# Patient Record
Sex: FEMALE | Race: WHITE | Employment: FULL TIME | URBAN - METROPOLITAN AREA
[De-identification: names, ages, dates, MRNs, and addresses within clinical notes are randomized per-mention and may not be internally consistent; named-entity substitution may affect disease eponyms.]

---

## 2020-09-27 ENCOUNTER — HOSPITAL ENCOUNTER (EMERGENCY)
Age: 40
Discharge: HOME | End: 2020-09-27
Attending: EMERGENCY MEDICINE
Payer: COMMERCIAL

## 2020-09-27 VITALS
WEIGHT: 185.19 LBS | BODY MASS INDEX: 29.07 KG/M2 | TEMPERATURE: 98.2 F | HEART RATE: 93 BPM | OXYGEN SATURATION: 98 % | HEIGHT: 67 IN | RESPIRATION RATE: 16 BRPM | DIASTOLIC BLOOD PRESSURE: 93 MMHG | SYSTOLIC BLOOD PRESSURE: 144 MMHG

## 2020-09-27 DIAGNOSIS — S61.218A LACERATION OF INDEX FINGER WITHOUT FOREIGN BODY WITHOUT DAMAGE TO NAIL, UNSPECIFIED LATERALITY, INITIAL ENCOUNTER: Primary | ICD-10-CM

## 2020-09-27 PROCEDURE — 74011250636 HC RX REV CODE- 250/636: Performed by: EMERGENCY MEDICINE

## 2020-09-27 PROCEDURE — 77030002916 HC SUT ETHLN J&J -A

## 2020-09-27 PROCEDURE — 99282 EMERGENCY DEPT VISIT SF MDM: CPT

## 2020-09-27 PROCEDURE — 75810000293 HC SIMP/SUPERF WND  RPR

## 2020-09-27 PROCEDURE — 74011000250 HC RX REV CODE- 250: Performed by: EMERGENCY MEDICINE

## 2020-09-27 PROCEDURE — 90715 TDAP VACCINE 7 YRS/> IM: CPT | Performed by: EMERGENCY MEDICINE

## 2020-09-27 PROCEDURE — 90471 IMMUNIZATION ADMIN: CPT

## 2020-09-27 RX ORDER — LIDOCAINE HYDROCHLORIDE 10 MG/ML
10 INJECTION, SOLUTION EPIDURAL; INFILTRATION; INTRACAUDAL; PERINEURAL ONCE
Status: COMPLETED | OUTPATIENT
Start: 2020-09-27 | End: 2020-09-27

## 2020-09-27 RX ADMIN — TETANUS TOXOID, REDUCED DIPHTHERIA TOXOID AND ACELLULAR PERTUSSIS VACCINE, ADSORBED 0.5 ML: 5; 2.5; 8; 8; 2.5 SUSPENSION INTRAMUSCULAR at 12:16

## 2020-09-27 RX ADMIN — LIDOCAINE HYDROCHLORIDE 10 ML: 10 INJECTION, SOLUTION EPIDURAL; INFILTRATION; INTRACAUDAL; PERINEURAL at 12:04

## 2020-09-27 NOTE — DISCHARGE INSTRUCTIONS
Patient Education        Cuts on the Hand Closed With Stitches: Care Instructions  Your Care Instructions     A cut on your hand can be on your fingers, your thumb, or the front or back of your hand. Sometimes a cut can injure the tendons, blood vessels, or nerves of your hand. The doctor used stitches to close the cut. Using stitches also helps the cut heal and reduces scarring. The doctor may have given you a splint to help prevent you from moving your hand, fingers, or thumb. If the cut went deep and through the skin, the doctor put in two layers of stitches. The deeper layer brings the deep part of the cut together. These stitches will dissolve and don't need to be removed. The stitches in the upper layer are the ones you see on the cut. You will probably have a bandage. You will need to have the stitches removed, usually in 7 to 14 days. The doctor may suggest that you see a hand specialist if the cut is very deep or if you have trouble moving your fingers or have less feeling in your hand. The doctor has checked you carefully, but problems can develop later. If you notice any problems or new symptoms, get medical treatment right away. Follow-up care is a key part of your treatment and safety. Be sure to make and go to all appointments, and call your doctor if you are having problems. It's also a good idea to know your test results and keep a list of the medicines you take. How can you care for yourself at home? · Keep the cut dry for the first 24 to 48 hours. After this, you can shower if your doctor okays it. Pat the cut dry. · Don't soak the cut, such as in a bathtub. Your doctor will tell you when it's safe to get the cut wet. · If your doctor told you how to care for your cut, follow your doctor's instructions. If you did not get instructions, follow this general advice:  ? After the first 24 to 48 hours, wash around the cut with clean water 2 times a day.  Don't use hydrogen peroxide or alcohol, which can slow healing. ? You may cover the cut with a thin layer of petroleum jelly, such as Vaseline, and a nonstick bandage. ? Apply more petroleum jelly and replace the bandage as needed. · Prop up the sore hand on a pillow anytime you sit or lie down during the next 3 days. Try to keep it above the level of your heart. This will help reduce swelling. · Avoid any activity that could cause your cut to reopen. · Do not remove the stitches on your own. Your doctor will tell you when to come back to have the stitches removed. · Be safe with medicines. Take pain medicines exactly as directed. ? If the doctor gave you a prescription medicine for pain, take it as prescribed. ? If you are not taking a prescription pain medicine, ask your doctor if you can take an over-the-counter medicine. When should you call for help? Call your doctor now or seek immediate medical care if:    · You have new pain, or your pain gets worse.     · The skin near the cut is cold or pale or changes color.     · You have tingling, weakness, or numbness near the cut.     · The cut starts to bleed, and blood soaks through the bandage. Oozing small amounts of blood is normal.     · You have trouble moving the area of the hand near the cut.     · You have symptoms of infection, such as:  ? Increased pain, swelling, warmth, or redness around the cut.  ? Red streaks leading from the cut.  ? Pus draining from the cut.  ? A fever. Watch closely for changes in your health, and be sure to contact your doctor if:    · You do not get better as expected. Where can you learn more? Go to http://michael-brenda.info/  Enter T250 in the search box to learn more about \"Cuts on the Hand Closed With Stitches: Care Instructions. \"  Current as of: June 26, 2019               Content Version: 12.6  © 4098-0132 [employer].    Care instructions adapted under license by [employer] (which disclaims liability or warranty for this information). If you have questions about a medical condition or this instruction, always ask your healthcare professional. [place] [address] any warranty or liability for your use of this information.

## 2020-09-27 NOTE — ED TRIAGE NOTES
Pt presents to ED with c/o laceration to volar surface of left 2nd finger. Pt cut finger on pastry knife about 45 minutes PTA. Bleeding controlled. Dr Michelle Andersen in to examine pt.

## 2020-09-27 NOTE — ED PROVIDER NOTES
HPI the patient has a 2 cm laceration to the left index finger, palmar surface, just distal to the DIP joint. Distal capillary refill and sensation is intact. Range of motion of the DIP joint is normal.    Past Medical History:   Diagnosis Date    Family history of skin cancer     Sunburn, blistering        Past Surgical History:   Procedure Laterality Date    HX  SECTION  2008    HX CYST REMOVAL  2002    cyst removed on wrist    HX WISDOM TEETH EXTRACTION           Family History:   Problem Relation Age of Onset    Hypertension Father     High Cholesterol Father        Social History     Socioeconomic History    Marital status:      Spouse name: Not on file    Number of children: Not on file    Years of education: Not on file    Highest education level: Not on file   Occupational History    Not on file   Social Needs    Financial resource strain: Not on file    Food insecurity     Worry: Not on file     Inability: Not on file    Transportation needs     Medical: Not on file     Non-medical: Not on file   Tobacco Use    Smoking status: Former Smoker    Smokeless tobacco: Never Used   Substance and Sexual Activity    Alcohol use:  Yes     Alcohol/week: 3.3 standard drinks     Types: 4 Glasses of wine per week    Drug use: Not on file    Sexual activity: Not on file   Lifestyle    Physical activity     Days per week: Not on file     Minutes per session: Not on file    Stress: Not on file   Relationships    Social connections     Talks on phone: Not on file     Gets together: Not on file     Attends Worship service: Not on file     Active member of club or organization: Not on file     Attends meetings of clubs or organizations: Not on file     Relationship status: Not on file    Intimate partner violence     Fear of current or ex partner: Not on file     Emotionally abused: Not on file     Physically abused: Not on file     Forced sexual activity: Not on file   Other Topics Concern    Not on file   Social History Narrative    Not on file         ALLERGIES: Patient has no known allergies. Review of Systems   Skin: Positive for wound. There were no vitals filed for this visit. Physical Exam  Constitutional:       General: She is not in acute distress. Appearance: She is well-developed. HENT:      Head: Normocephalic. Neck:      Musculoskeletal: Normal range of motion. Cardiovascular:      Rate and Rhythm: Normal rate. Pulmonary:      Effort: Pulmonary effort is normal.   Musculoskeletal: Normal range of motion. Skin:     General: Skin is warm and dry. Capillary Refill: Capillary refill takes less than 2 seconds. Comments: See HPI   Neurological:      Mental Status: She is alert. Psychiatric:         Behavior: Behavior normal.          [facility]       Wound Repair    Date/Time: 9/27/2020 11:56 AM  Performed by: attendingPreparation: skin prepped with Betadine  Location details: left index finger  Wound length:2.5 cm or less  Anesthesia: digital block    Anesthesia:  Local Anesthetic: lidocaine 1% without epinephrine  Foreign bodies: no foreign bodies  Skin closure: 5-0 nylon  Technique: simple  Patient tolerance: Patient tolerated the procedure well with no immediate complications  My total time at bedside, performing this procedure was 16-30 minutes.

## 2023-04-22 ENCOUNTER — APPOINTMENT (OUTPATIENT)
Dept: CT IMAGING | Age: 43
End: 2023-04-22
Attending: EMERGENCY MEDICINE
Payer: COMMERCIAL

## 2023-04-22 ENCOUNTER — HOSPITAL ENCOUNTER (OUTPATIENT)
Age: 43
Setting detail: OBSERVATION
Discharge: HOME | End: 2023-04-25
Attending: EMERGENCY MEDICINE | Admitting: INTERNAL MEDICINE
Payer: COMMERCIAL

## 2023-04-22 DIAGNOSIS — K80.50 CHOLEDOCHOLITHIASIS: ICD-10-CM

## 2023-04-22 DIAGNOSIS — K80.50 BILIARY COLIC: Primary | ICD-10-CM

## 2023-04-22 LAB
ALBUMIN SERPL-MCNC: 3.8 G/DL (ref 3.5–5)
ALBUMIN/GLOB SERPL: 1.4 (ref 1.1–2.2)
ALP SERPL-CCNC: 286 U/L (ref 45–117)
ALT SERPL-CCNC: 733 U/L (ref 12–78)
ANION GAP SERPL CALC-SCNC: 13 MMOL/L (ref 5–15)
APPEARANCE UR: CLEAR
AST SERPL-CCNC: 224 U/L (ref 15–37)
BACTERIA URNS QL MICRO: ABNORMAL /HPF
BASOPHILS # BLD: 0.1 K/UL (ref 0–0.1)
BASOPHILS NFR BLD: 1 % (ref 0–1)
BILIRUB SERPL-MCNC: 3 MG/DL (ref 0.2–1)
BILIRUB UR QL CFM: POSITIVE
BUN SERPL-MCNC: 9 MG/DL (ref 6–20)
BUN/CREAT SERPL: 10 (ref 12–20)
CALCIUM SERPL-MCNC: 8.9 MG/DL (ref 8.5–10.1)
CHLORIDE SERPL-SCNC: 102 MMOL/L (ref 97–108)
CO2 SERPL-SCNC: 26 MMOL/L (ref 21–32)
COLOR UR: ABNORMAL
CREAT SERPL-MCNC: 0.88 MG/DL (ref 0.55–1.02)
DIFFERENTIAL METHOD BLD: ABNORMAL
EOSINOPHIL # BLD: 0.1 K/UL (ref 0–0.4)
EOSINOPHIL NFR BLD: 1 % (ref 0–7)
EPITH CASTS URNS QL MICRO: ABNORMAL /LPF
ERYTHROCYTE [DISTWIDTH] IN BLOOD BY AUTOMATED COUNT: 13.6 % (ref 11.5–14.5)
GLOBULIN SER CALC-MCNC: 2.8 G/DL (ref 2–4)
GLUCOSE SERPL-MCNC: 104 MG/DL (ref 65–100)
GLUCOSE UR STRIP.AUTO-MCNC: NEGATIVE MG/DL
HCG UR QL: NEGATIVE
HCT VFR BLD AUTO: 42.6 % (ref 35–47)
HGB BLD-MCNC: 13.9 G/DL (ref 11.5–16)
HGB UR QL STRIP: NEGATIVE
IMM GRANULOCYTES # BLD AUTO: 0.1 K/UL (ref 0–0.04)
IMM GRANULOCYTES NFR BLD AUTO: 1 % (ref 0–0.5)
KETONES UR QL STRIP.AUTO: ABNORMAL MG/DL
LEUKOCYTE ESTERASE UR QL STRIP.AUTO: NEGATIVE
LIPASE SERPL-CCNC: 156 U/L (ref 73–393)
LYMPHOCYTES # BLD: 2.9 K/UL (ref 0.8–3.5)
LYMPHOCYTES NFR BLD: 40 % (ref 12–49)
MCH RBC QN AUTO: 30 PG (ref 26–34)
MCHC RBC AUTO-ENTMCNC: 32.6 G/DL (ref 30–36.5)
MCV RBC AUTO: 91.8 FL (ref 80–99)
MONOCYTES # BLD: 0.5 K/UL (ref 0–1)
MONOCYTES NFR BLD: 7 % (ref 5–13)
NEUTS SEG # BLD: 3.8 K/UL (ref 1.8–8)
NEUTS SEG NFR BLD: 51 % (ref 32–75)
NITRITE UR QL STRIP.AUTO: NEGATIVE
NRBC # BLD: 0 K/UL (ref 0–0.01)
NRBC BLD-RTO: 0 PER 100 WBC
PH UR STRIP: 7 (ref 5–8)
PLATELET # BLD AUTO: 284 K/UL (ref 150–400)
PMV BLD AUTO: 9.5 FL (ref 8.9–12.9)
POTASSIUM SERPL-SCNC: 4.2 MMOL/L (ref 3.5–5.1)
PROT SERPL-MCNC: 6.6 G/DL (ref 6.4–8.2)
PROT UR STRIP-MCNC: NEGATIVE MG/DL
RBC # BLD AUTO: 4.64 M/UL (ref 3.8–5.2)
RBC #/AREA URNS HPF: ABNORMAL /HPF (ref 0–5)
SODIUM SERPL-SCNC: 141 MMOL/L (ref 136–145)
SP GR UR REFRACTOMETRY: 1.01 (ref 1–1.03)
TROPONIN I SERPL HS-MCNC: 5 NG/L (ref 0–51)
UR CULT HOLD, URHOLD: NORMAL
UROBILINOGEN UR QL STRIP.AUTO: 1 EU/DL (ref 0.2–1)
WBC # BLD AUTO: 7.4 K/UL (ref 3.6–11)
WBC URNS QL MICRO: ABNORMAL /HPF (ref 0–4)

## 2023-04-22 PROCEDURE — 74011250636 HC RX REV CODE- 250/636: Performed by: INTERNAL MEDICINE

## 2023-04-22 PROCEDURE — 74011000250 HC RX REV CODE- 250: Performed by: EMERGENCY MEDICINE

## 2023-04-22 PROCEDURE — 93005 ELECTROCARDIOGRAM TRACING: CPT

## 2023-04-22 PROCEDURE — 36415 COLL VENOUS BLD VENIPUNCTURE: CPT

## 2023-04-22 PROCEDURE — 96374 THER/PROPH/DIAG INJ IV PUSH: CPT

## 2023-04-22 PROCEDURE — 80053 COMPREHEN METABOLIC PANEL: CPT

## 2023-04-22 PROCEDURE — 85025 COMPLETE CBC W/AUTO DIFF WBC: CPT

## 2023-04-22 PROCEDURE — 74177 CT ABD & PELVIS W/CONTRAST: CPT

## 2023-04-22 PROCEDURE — 74011000636 HC RX REV CODE- 636: Performed by: EMERGENCY MEDICINE

## 2023-04-22 PROCEDURE — G0378 HOSPITAL OBSERVATION PER HR: HCPCS

## 2023-04-22 PROCEDURE — 84484 ASSAY OF TROPONIN QUANT: CPT

## 2023-04-22 PROCEDURE — 83690 ASSAY OF LIPASE: CPT

## 2023-04-22 PROCEDURE — 81001 URINALYSIS AUTO W/SCOPE: CPT

## 2023-04-22 PROCEDURE — 81025 URINE PREGNANCY TEST: CPT

## 2023-04-22 PROCEDURE — C9113 INJ PANTOPRAZOLE SODIUM, VIA: HCPCS | Performed by: EMERGENCY MEDICINE

## 2023-04-22 PROCEDURE — 99285 EMERGENCY DEPT VISIT HI MDM: CPT

## 2023-04-22 PROCEDURE — 74011250636 HC RX REV CODE- 250/636: Performed by: EMERGENCY MEDICINE

## 2023-04-22 PROCEDURE — 96375 TX/PRO/DX INJ NEW DRUG ADDON: CPT

## 2023-04-22 RX ORDER — POLYETHYLENE GLYCOL 3350 17 G/17G
17 POWDER, FOR SOLUTION ORAL DAILY PRN
Status: DISCONTINUED | OUTPATIENT
Start: 2023-04-22 | End: 2023-04-25 | Stop reason: HOSPADM

## 2023-04-22 RX ORDER — ACETAMINOPHEN 325 MG/1
650 TABLET ORAL
Status: DISCONTINUED | OUTPATIENT
Start: 2023-04-22 | End: 2023-04-22

## 2023-04-22 RX ORDER — MORPHINE SULFATE 2 MG/ML
2 INJECTION, SOLUTION INTRAMUSCULAR; INTRAVENOUS
Status: COMPLETED | OUTPATIENT
Start: 2023-04-22 | End: 2023-04-22

## 2023-04-22 RX ORDER — ENOXAPARIN SODIUM 100 MG/ML
40 INJECTION SUBCUTANEOUS DAILY
Status: DISCONTINUED | OUTPATIENT
Start: 2023-04-23 | End: 2023-04-23

## 2023-04-22 RX ORDER — SODIUM CHLORIDE 0.9 % (FLUSH) 0.9 %
5-40 SYRINGE (ML) INJECTION AS NEEDED
Status: DISCONTINUED | OUTPATIENT
Start: 2023-04-22 | End: 2023-04-25 | Stop reason: HOSPADM

## 2023-04-22 RX ORDER — ACETAMINOPHEN 650 MG/1
650 SUPPOSITORY RECTAL
Status: DISCONTINUED | OUTPATIENT
Start: 2023-04-22 | End: 2023-04-22

## 2023-04-22 RX ORDER — SODIUM CHLORIDE 0.9 % (FLUSH) 0.9 %
5-40 SYRINGE (ML) INJECTION EVERY 8 HOURS
Status: DISCONTINUED | OUTPATIENT
Start: 2023-04-22 | End: 2023-04-25 | Stop reason: HOSPADM

## 2023-04-22 RX ORDER — SODIUM CHLORIDE, SODIUM LACTATE, POTASSIUM CHLORIDE, CALCIUM CHLORIDE 600; 310; 30; 20 MG/100ML; MG/100ML; MG/100ML; MG/100ML
125 INJECTION, SOLUTION INTRAVENOUS CONTINUOUS
Status: DISCONTINUED | OUTPATIENT
Start: 2023-04-22 | End: 2023-04-25 | Stop reason: HOSPADM

## 2023-04-22 RX ORDER — HYDROMORPHONE HYDROCHLORIDE 1 MG/ML
0.5 INJECTION, SOLUTION INTRAMUSCULAR; INTRAVENOUS; SUBCUTANEOUS
Status: DISCONTINUED | OUTPATIENT
Start: 2023-04-22 | End: 2023-04-25 | Stop reason: HOSPADM

## 2023-04-22 RX ORDER — ONDANSETRON 2 MG/ML
4 INJECTION INTRAMUSCULAR; INTRAVENOUS
Status: COMPLETED | OUTPATIENT
Start: 2023-04-22 | End: 2023-04-22

## 2023-04-22 RX ORDER — ONDANSETRON 4 MG/1
4 TABLET, ORALLY DISINTEGRATING ORAL
Status: DISCONTINUED | OUTPATIENT
Start: 2023-04-22 | End: 2023-04-25 | Stop reason: HOSPADM

## 2023-04-22 RX ORDER — ONDANSETRON 2 MG/ML
4 INJECTION INTRAMUSCULAR; INTRAVENOUS
Status: DISCONTINUED | OUTPATIENT
Start: 2023-04-22 | End: 2023-04-25 | Stop reason: HOSPADM

## 2023-04-22 RX ADMIN — MORPHINE SULFATE 2 MG: 2 INJECTION, SOLUTION INTRAMUSCULAR; INTRAVENOUS at 23:27

## 2023-04-22 RX ADMIN — ONDANSETRON 4 MG: 2 INJECTION INTRAMUSCULAR; INTRAVENOUS at 21:28

## 2023-04-22 RX ADMIN — SODIUM CHLORIDE 80 MG: 9 INJECTION, SOLUTION INTRAMUSCULAR; INTRAVENOUS; SUBCUTANEOUS at 21:28

## 2023-04-22 RX ADMIN — IOPAMIDOL 100 ML: 755 INJECTION, SOLUTION INTRAVENOUS at 21:58

## 2023-04-22 RX ADMIN — SODIUM CHLORIDE, POTASSIUM CHLORIDE, SODIUM LACTATE AND CALCIUM CHLORIDE 125 ML/HR: 600; 310; 30; 20 INJECTION, SOLUTION INTRAVENOUS at 23:27

## 2023-04-23 PROBLEM — R79.89 ELEVATED LFTS: Status: ACTIVE | Noted: 2023-04-23

## 2023-04-23 PROBLEM — I10 HTN (HYPERTENSION), BENIGN: Status: ACTIVE | Noted: 2023-04-23

## 2023-04-23 PROBLEM — K80.71 CALCULUS OF GALLBLADDER AND BILE DUCT WITH OBSTRUCTION: Status: ACTIVE | Noted: 2023-04-23

## 2023-04-23 LAB
ALBUMIN SERPL-MCNC: 3.4 G/DL (ref 3.5–5)
ALBUMIN/GLOB SERPL: 1.1 (ref 1.1–2.2)
ALP SERPL-CCNC: 292 U/L (ref 45–117)
ALT SERPL-CCNC: 662 U/L (ref 12–78)
ANION GAP SERPL CALC-SCNC: 3 MMOL/L (ref 5–15)
AST SERPL-CCNC: 225 U/L (ref 15–37)
ATRIAL RATE: 68 BPM
BASOPHILS # BLD: 0.1 K/UL (ref 0–0.1)
BASOPHILS NFR BLD: 1 % (ref 0–1)
BILIRUB SERPL-MCNC: 3.1 MG/DL (ref 0.2–1)
BUN SERPL-MCNC: 6 MG/DL (ref 6–20)
BUN/CREAT SERPL: 8 (ref 12–20)
CALCIUM SERPL-MCNC: 8.5 MG/DL (ref 8.5–10.1)
CALCULATED P AXIS, ECG09: 34 DEGREES
CALCULATED R AXIS, ECG10: 7 DEGREES
CALCULATED T AXIS, ECG11: 28 DEGREES
CHLORIDE SERPL-SCNC: 110 MMOL/L (ref 97–108)
CO2 SERPL-SCNC: 25 MMOL/L (ref 21–32)
CREAT SERPL-MCNC: 0.77 MG/DL (ref 0.55–1.02)
DIAGNOSIS, 93000: NORMAL
DIFFERENTIAL METHOD BLD: NORMAL
EOSINOPHIL # BLD: 0.1 K/UL (ref 0–0.4)
EOSINOPHIL NFR BLD: 1 % (ref 0–7)
ERYTHROCYTE [DISTWIDTH] IN BLOOD BY AUTOMATED COUNT: 13.7 % (ref 11.5–14.5)
GLOBULIN SER CALC-MCNC: 3 G/DL (ref 2–4)
GLUCOSE SERPL-MCNC: 109 MG/DL (ref 65–100)
HCT VFR BLD AUTO: 41.7 % (ref 35–47)
HGB BLD-MCNC: 13.8 G/DL (ref 11.5–16)
IMM GRANULOCYTES # BLD AUTO: 0 K/UL (ref 0–0.04)
IMM GRANULOCYTES NFR BLD AUTO: 0 % (ref 0–0.5)
LYMPHOCYTES # BLD: 2.3 K/UL (ref 0.8–3.5)
LYMPHOCYTES NFR BLD: 31 % (ref 12–49)
MCH RBC QN AUTO: 29.9 PG (ref 26–34)
MCHC RBC AUTO-ENTMCNC: 33.1 G/DL (ref 30–36.5)
MCV RBC AUTO: 90.5 FL (ref 80–99)
MONOCYTES # BLD: 0.5 K/UL (ref 0–1)
MONOCYTES NFR BLD: 7 % (ref 5–13)
NEUTS SEG # BLD: 4.4 K/UL (ref 1.8–8)
NEUTS SEG NFR BLD: 60 % (ref 32–75)
NRBC # BLD: 0 K/UL (ref 0–0.01)
NRBC BLD-RTO: 0 PER 100 WBC
P-R INTERVAL, ECG05: 136 MS
PLATELET # BLD AUTO: 287 K/UL (ref 150–400)
PMV BLD AUTO: 9.7 FL (ref 8.9–12.9)
POTASSIUM SERPL-SCNC: 4 MMOL/L (ref 3.5–5.1)
PROT SERPL-MCNC: 6.4 G/DL (ref 6.4–8.2)
Q-T INTERVAL, ECG07: 392 MS
QRS DURATION, ECG06: 84 MS
QTC CALCULATION (BEZET), ECG08: 416 MS
RBC # BLD AUTO: 4.61 M/UL (ref 3.8–5.2)
SODIUM SERPL-SCNC: 138 MMOL/L (ref 136–145)
VENTRICULAR RATE, ECG03: 68 BPM
WBC # BLD AUTO: 7.4 K/UL (ref 3.6–11)

## 2023-04-23 PROCEDURE — G0378 HOSPITAL OBSERVATION PER HR: HCPCS

## 2023-04-23 PROCEDURE — 80053 COMPREHEN METABOLIC PANEL: CPT

## 2023-04-23 PROCEDURE — 74011000250 HC RX REV CODE- 250: Performed by: INTERNAL MEDICINE

## 2023-04-23 PROCEDURE — 85025 COMPLETE CBC W/AUTO DIFF WBC: CPT

## 2023-04-23 PROCEDURE — 96375 TX/PRO/DX INJ NEW DRUG ADDON: CPT

## 2023-04-23 PROCEDURE — 36415 COLL VENOUS BLD VENIPUNCTURE: CPT

## 2023-04-23 PROCEDURE — 74011250636 HC RX REV CODE- 250/636: Performed by: INTERNAL MEDICINE

## 2023-04-23 PROCEDURE — 96376 TX/PRO/DX INJ SAME DRUG ADON: CPT

## 2023-04-23 PROCEDURE — 87086 URINE CULTURE/COLONY COUNT: CPT

## 2023-04-23 RX ADMIN — Medication 10 ML: at 13:37

## 2023-04-23 RX ADMIN — HYDROMORPHONE HYDROCHLORIDE 0.5 MG: 1 INJECTION, SOLUTION INTRAMUSCULAR; INTRAVENOUS; SUBCUTANEOUS at 21:24

## 2023-04-23 RX ADMIN — HYDROMORPHONE HYDROCHLORIDE 0.5 MG: 1 INJECTION, SOLUTION INTRAMUSCULAR; INTRAVENOUS; SUBCUTANEOUS at 18:20

## 2023-04-23 RX ADMIN — Medication 10 ML: at 01:30

## 2023-04-23 RX ADMIN — ONDANSETRON 4 MG: 2 INJECTION INTRAMUSCULAR; INTRAVENOUS at 10:19

## 2023-04-23 RX ADMIN — Medication 10 ML: at 20:42

## 2023-04-23 RX ADMIN — SODIUM CHLORIDE, POTASSIUM CHLORIDE, SODIUM LACTATE AND CALCIUM CHLORIDE 125 ML/HR: 600; 310; 30; 20 INJECTION, SOLUTION INTRAVENOUS at 20:42

## 2023-04-23 RX ADMIN — Medication 10 ML: at 06:00

## 2023-04-23 RX ADMIN — SODIUM CHLORIDE, POTASSIUM CHLORIDE, SODIUM LACTATE AND CALCIUM CHLORIDE 125 ML/HR: 600; 310; 30; 20 INJECTION, SOLUTION INTRAVENOUS at 09:42

## 2023-04-24 ENCOUNTER — APPOINTMENT (OUTPATIENT)
Dept: GENERAL RADIOLOGY | Age: 43
End: 2023-04-24
Attending: INTERNAL MEDICINE
Payer: COMMERCIAL

## 2023-04-24 ENCOUNTER — ANESTHESIA EVENT (OUTPATIENT)
Dept: SURGERY | Age: 43
End: 2023-04-24
Payer: COMMERCIAL

## 2023-04-24 ENCOUNTER — ANESTHESIA (OUTPATIENT)
Dept: SURGERY | Age: 43
End: 2023-04-24
Payer: COMMERCIAL

## 2023-04-24 LAB
ALBUMIN SERPL-MCNC: 3.4 G/DL (ref 3.5–5)
ALBUMIN/GLOB SERPL: 1.1 (ref 1.1–2.2)
ALP SERPL-CCNC: 323 U/L (ref 45–117)
ALT SERPL-CCNC: 551 U/L (ref 12–78)
ANION GAP SERPL CALC-SCNC: 2 MMOL/L (ref 5–15)
AST SERPL-CCNC: 136 U/L (ref 15–37)
BACTERIA SPEC CULT: NORMAL
BASOPHILS # BLD: 0.1 K/UL (ref 0–0.1)
BASOPHILS NFR BLD: 1 % (ref 0–1)
BILIRUB SERPL-MCNC: 4.8 MG/DL (ref 0.2–1)
BUN SERPL-MCNC: 7 MG/DL (ref 6–20)
BUN/CREAT SERPL: 9 (ref 12–20)
CALCIUM SERPL-MCNC: 8.6 MG/DL (ref 8.5–10.1)
CHLORIDE SERPL-SCNC: 108 MMOL/L (ref 97–108)
CO2 SERPL-SCNC: 27 MMOL/L (ref 21–32)
CREAT SERPL-MCNC: 0.81 MG/DL (ref 0.55–1.02)
DIFFERENTIAL METHOD BLD: ABNORMAL
EOSINOPHIL # BLD: 0.1 K/UL (ref 0–0.4)
EOSINOPHIL NFR BLD: 1 % (ref 0–7)
ERYTHROCYTE [DISTWIDTH] IN BLOOD BY AUTOMATED COUNT: 14.1 % (ref 11.5–14.5)
GLOBULIN SER CALC-MCNC: 3 G/DL (ref 2–4)
GLUCOSE SERPL-MCNC: 125 MG/DL (ref 65–100)
HCT VFR BLD AUTO: 42.3 % (ref 35–47)
HGB BLD-MCNC: 14.3 G/DL (ref 11.5–16)
IMM GRANULOCYTES # BLD AUTO: 0 K/UL (ref 0–0.04)
IMM GRANULOCYTES NFR BLD AUTO: 1 % (ref 0–0.5)
LYMPHOCYTES # BLD: 2.1 K/UL (ref 0.8–3.5)
LYMPHOCYTES NFR BLD: 24 % (ref 12–49)
MCH RBC QN AUTO: 30.8 PG (ref 26–34)
MCHC RBC AUTO-ENTMCNC: 33.8 G/DL (ref 30–36.5)
MCV RBC AUTO: 91 FL (ref 80–99)
MONOCYTES # BLD: 0.6 K/UL (ref 0–1)
MONOCYTES NFR BLD: 7 % (ref 5–13)
NEUTS SEG # BLD: 5.8 K/UL (ref 1.8–8)
NEUTS SEG NFR BLD: 66 % (ref 32–75)
NRBC # BLD: 0 K/UL (ref 0–0.01)
NRBC BLD-RTO: 0 PER 100 WBC
PLATELET # BLD AUTO: 295 K/UL (ref 150–400)
PMV BLD AUTO: 9.6 FL (ref 8.9–12.9)
POTASSIUM SERPL-SCNC: 4.2 MMOL/L (ref 3.5–5.1)
PROT SERPL-MCNC: 6.4 G/DL (ref 6.4–8.2)
RBC # BLD AUTO: 4.65 M/UL (ref 3.8–5.2)
SERVICE CMNT-IMP: NORMAL
SODIUM SERPL-SCNC: 137 MMOL/L (ref 136–145)
WBC # BLD AUTO: 8.6 K/UL (ref 3.6–11)

## 2023-04-24 PROCEDURE — 77030034668 HC DEV CLOS PUNC DISP ANCH -B: Performed by: SURGERY

## 2023-04-24 PROCEDURE — 77030028402 HC SYS LAPSC TISS RETRV AMR -B: Performed by: SURGERY

## 2023-04-24 PROCEDURE — 80053 COMPREHEN METABOLIC PANEL: CPT

## 2023-04-24 PROCEDURE — 77030012770 HC TRCR OPT FX AMR -B: Performed by: SURGERY

## 2023-04-24 PROCEDURE — 76210000016 HC OR PH I REC 1 TO 1.5 HR: Performed by: SURGERY

## 2023-04-24 PROCEDURE — C1726 CATH, BAL DIL, NON-VASCULAR: HCPCS | Performed by: SURGERY

## 2023-04-24 PROCEDURE — 74011250637 HC RX REV CODE- 250/637: Performed by: INTERNAL MEDICINE

## 2023-04-24 PROCEDURE — 74011250636 HC RX REV CODE- 250/636: Performed by: NURSE ANESTHETIST, CERTIFIED REGISTERED

## 2023-04-24 PROCEDURE — G0378 HOSPITAL OBSERVATION PER HR: HCPCS

## 2023-04-24 PROCEDURE — 77030031656 HC FCPS ENDO GRSP DISP BSC -B: Performed by: SURGERY

## 2023-04-24 PROCEDURE — 74011000250 HC RX REV CODE- 250: Performed by: INTERNAL MEDICINE

## 2023-04-24 PROCEDURE — 76010000877 HC OR TIME 2.5 TO 3HR INTENSV - TIER 2: Performed by: SURGERY

## 2023-04-24 PROCEDURE — 77030010104 HC SEAL PRT ENDOSC BYRN -B: Performed by: SURGERY

## 2023-04-24 PROCEDURE — 74300 X-RAY BILE DUCTS/PANCREAS: CPT

## 2023-04-24 PROCEDURE — 88304 TISSUE EXAM BY PATHOLOGIST: CPT

## 2023-04-24 PROCEDURE — 77030040922 HC BLNKT HYPOTHRM STRY -A

## 2023-04-24 PROCEDURE — 77030040361 HC SLV COMPR DVT MDII -B

## 2023-04-24 PROCEDURE — 77030035277 HC OBTRTR BLDELSS DISP INTU -B: Performed by: SURGERY

## 2023-04-24 PROCEDURE — 77030011761 HC SPHNTOM BILI BSC -C: Performed by: SURGERY

## 2023-04-24 PROCEDURE — 77030038093 HC CATH CHOLGM OP PMI PRGV-C: Performed by: SURGERY

## 2023-04-24 PROCEDURE — 2709999900 HC NON-CHARGEABLE SUPPLY: Performed by: SURGERY

## 2023-04-24 PROCEDURE — 76060000036 HC ANESTHESIA 2.5 TO 3 HR: Performed by: SURGERY

## 2023-04-24 PROCEDURE — 77030040926 HC LAP BG TISS RETVR CNMD -B: Performed by: SURGERY

## 2023-04-24 PROCEDURE — 74011000254 HC RX REV CODE- 254: Performed by: SURGERY

## 2023-04-24 PROCEDURE — 74011000250 HC RX REV CODE- 250: Performed by: NURSE ANESTHETIST, CERTIFIED REGISTERED

## 2023-04-24 PROCEDURE — 74011000636 HC RX REV CODE- 636: Performed by: SURGERY

## 2023-04-24 PROCEDURE — 74011250636 HC RX REV CODE- 250/636: Performed by: SURGERY

## 2023-04-24 PROCEDURE — 77030010603 HC BLN DEV INFL BSC -B: Performed by: SURGERY

## 2023-04-24 PROCEDURE — 36415 COLL VENOUS BLD VENIPUNCTURE: CPT

## 2023-04-24 PROCEDURE — 77030020703 HC SEAL CANN DISP INTU -B: Performed by: SURGERY

## 2023-04-24 PROCEDURE — 77030041276 HC SPHNTOM BILI BSC -F: Performed by: SURGERY

## 2023-04-24 PROCEDURE — 74011250636 HC RX REV CODE- 250/636: Performed by: NURSE PRACTITIONER

## 2023-04-24 PROCEDURE — 77030021593 HC FCPS BIOP ENDOSC BSC -A: Performed by: SURGERY

## 2023-04-24 PROCEDURE — 77030020747 HC TU INSUF ENDOSC TELE -A: Performed by: SURGERY

## 2023-04-24 PROCEDURE — 74011000250 HC RX REV CODE- 250: Performed by: SURGERY

## 2023-04-24 PROCEDURE — 85025 COMPLETE CBC W/AUTO DIFF WBC: CPT

## 2023-04-24 PROCEDURE — 77030031139 HC SUT VCRL2 J&J -A: Performed by: SURGERY

## 2023-04-24 PROCEDURE — 74011250636 HC RX REV CODE- 250/636: Performed by: ANESTHESIOLOGY

## 2023-04-24 PROCEDURE — C1769 GUIDE WIRE: HCPCS | Performed by: SURGERY

## 2023-04-24 PROCEDURE — 77030035029 HC NDL INSUF VERES DISP COVD -B: Performed by: SURGERY

## 2023-04-24 PROCEDURE — 77030007288 HC DEV LOK BILI BSC -A: Performed by: SURGERY

## 2023-04-24 PROCEDURE — 96375 TX/PRO/DX INJ NEW DRUG ADDON: CPT

## 2023-04-24 PROCEDURE — 77030013992 HC SNR POLYP ENDOSC BSC -B: Performed by: SURGERY

## 2023-04-24 PROCEDURE — 74011250636 HC RX REV CODE- 250/636: Performed by: INTERNAL MEDICINE

## 2023-04-24 PROCEDURE — 77030010507 HC ADH SKN DERMBND J&J -B: Performed by: SURGERY

## 2023-04-24 PROCEDURE — 77030009038 HC CATH BILI STN RTVR BSC -C: Performed by: SURGERY

## 2023-04-24 PROCEDURE — 96376 TX/PRO/DX INJ SAME DRUG ADON: CPT

## 2023-04-24 RX ORDER — DEXAMETHASONE SODIUM PHOSPHATE 4 MG/ML
INJECTION, SOLUTION INTRA-ARTICULAR; INTRALESIONAL; INTRAMUSCULAR; INTRAVENOUS; SOFT TISSUE AS NEEDED
Status: DISCONTINUED | OUTPATIENT
Start: 2023-04-24 | End: 2023-04-24 | Stop reason: HOSPADM

## 2023-04-24 RX ORDER — PROCHLORPERAZINE EDISYLATE 5 MG/ML
5 INJECTION INTRAMUSCULAR; INTRAVENOUS
Status: DISCONTINUED | OUTPATIENT
Start: 2023-04-24 | End: 2023-04-25 | Stop reason: HOSPADM

## 2023-04-24 RX ORDER — HYDROMORPHONE HYDROCHLORIDE 1 MG/ML
.25-1 INJECTION, SOLUTION INTRAMUSCULAR; INTRAVENOUS; SUBCUTANEOUS
Status: DISCONTINUED | OUTPATIENT
Start: 2023-04-24 | End: 2023-04-24 | Stop reason: HOSPADM

## 2023-04-24 RX ORDER — SODIUM CHLORIDE, SODIUM LACTATE, POTASSIUM CHLORIDE, CALCIUM CHLORIDE 600; 310; 30; 20 MG/100ML; MG/100ML; MG/100ML; MG/100ML
125 INJECTION, SOLUTION INTRAVENOUS CONTINUOUS
Status: DISCONTINUED | OUTPATIENT
Start: 2023-04-24 | End: 2023-04-24 | Stop reason: HOSPADM

## 2023-04-24 RX ORDER — ONDANSETRON 2 MG/ML
4 INJECTION INTRAMUSCULAR; INTRAVENOUS AS NEEDED
Status: DISCONTINUED | OUTPATIENT
Start: 2023-04-24 | End: 2023-04-24 | Stop reason: HOSPADM

## 2023-04-24 RX ORDER — MIDAZOLAM HYDROCHLORIDE 1 MG/ML
INJECTION, SOLUTION INTRAMUSCULAR; INTRAVENOUS AS NEEDED
Status: DISCONTINUED | OUTPATIENT
Start: 2023-04-24 | End: 2023-04-24 | Stop reason: HOSPADM

## 2023-04-24 RX ORDER — LIDOCAINE HYDROCHLORIDE 20 MG/ML
INJECTION, SOLUTION EPIDURAL; INFILTRATION; INTRACAUDAL; PERINEURAL AS NEEDED
Status: DISCONTINUED | OUTPATIENT
Start: 2023-04-24 | End: 2023-04-24 | Stop reason: HOSPADM

## 2023-04-24 RX ORDER — ROCURONIUM BROMIDE 10 MG/ML
INJECTION, SOLUTION INTRAVENOUS AS NEEDED
Status: DISCONTINUED | OUTPATIENT
Start: 2023-04-24 | End: 2023-04-24 | Stop reason: HOSPADM

## 2023-04-24 RX ORDER — KETOROLAC TROMETHAMINE 30 MG/ML
INJECTION, SOLUTION INTRAMUSCULAR; INTRAVENOUS AS NEEDED
Status: DISCONTINUED | OUTPATIENT
Start: 2023-04-24 | End: 2023-04-24 | Stop reason: HOSPADM

## 2023-04-24 RX ORDER — SODIUM CHLORIDE 0.9 % (FLUSH) 0.9 %
5-40 SYRINGE (ML) INJECTION AS NEEDED
Status: DISCONTINUED | OUTPATIENT
Start: 2023-04-24 | End: 2023-04-24 | Stop reason: HOSPADM

## 2023-04-24 RX ORDER — SODIUM CHLORIDE 0.9 % (FLUSH) 0.9 %
5-40 SYRINGE (ML) INJECTION EVERY 8 HOURS
Status: DISCONTINUED | OUTPATIENT
Start: 2023-04-24 | End: 2023-04-24 | Stop reason: HOSPADM

## 2023-04-24 RX ORDER — DIPHENHYDRAMINE HYDROCHLORIDE 50 MG/ML
12.5 INJECTION, SOLUTION INTRAMUSCULAR; INTRAVENOUS AS NEEDED
Status: DISCONTINUED | OUTPATIENT
Start: 2023-04-24 | End: 2023-04-24 | Stop reason: HOSPADM

## 2023-04-24 RX ORDER — FENTANYL CITRATE 50 UG/ML
INJECTION, SOLUTION INTRAMUSCULAR; INTRAVENOUS AS NEEDED
Status: DISCONTINUED | OUTPATIENT
Start: 2023-04-24 | End: 2023-04-24 | Stop reason: HOSPADM

## 2023-04-24 RX ORDER — SUCCINYLCHOLINE CHLORIDE 20 MG/ML
INJECTION INTRAMUSCULAR; INTRAVENOUS AS NEEDED
Status: DISCONTINUED | OUTPATIENT
Start: 2023-04-24 | End: 2023-04-24 | Stop reason: HOSPADM

## 2023-04-24 RX ORDER — BUPIVACAINE HYDROCHLORIDE 5 MG/ML
INJECTION, SOLUTION PERINEURAL AS NEEDED
Status: DISCONTINUED | OUTPATIENT
Start: 2023-04-24 | End: 2023-04-24 | Stop reason: HOSPADM

## 2023-04-24 RX ORDER — GLYCOPYRROLATE 0.2 MG/ML
INJECTION INTRAMUSCULAR; INTRAVENOUS AS NEEDED
Status: DISCONTINUED | OUTPATIENT
Start: 2023-04-24 | End: 2023-04-24 | Stop reason: HOSPADM

## 2023-04-24 RX ORDER — NEOSTIGMINE METHYLSULFATE 1 MG/ML
INJECTION, SOLUTION INTRAVENOUS AS NEEDED
Status: DISCONTINUED | OUTPATIENT
Start: 2023-04-24 | End: 2023-04-24 | Stop reason: HOSPADM

## 2023-04-24 RX ORDER — PROPOFOL 10 MG/ML
INJECTION, EMULSION INTRAVENOUS AS NEEDED
Status: DISCONTINUED | OUTPATIENT
Start: 2023-04-24 | End: 2023-04-24 | Stop reason: HOSPADM

## 2023-04-24 RX ORDER — FENTANYL CITRATE 50 UG/ML
25 INJECTION, SOLUTION INTRAMUSCULAR; INTRAVENOUS
Status: DISCONTINUED | OUTPATIENT
Start: 2023-04-24 | End: 2023-04-24 | Stop reason: HOSPADM

## 2023-04-24 RX ORDER — LIDOCAINE HYDROCHLORIDE 10 MG/ML
0.1 INJECTION, SOLUTION EPIDURAL; INFILTRATION; INTRACAUDAL; PERINEURAL AS NEEDED
Status: DISCONTINUED | OUTPATIENT
Start: 2023-04-24 | End: 2023-04-24 | Stop reason: HOSPADM

## 2023-04-24 RX ORDER — FAMOTIDINE 10 MG/ML
INJECTION INTRAVENOUS AS NEEDED
Status: DISCONTINUED | OUTPATIENT
Start: 2023-04-24 | End: 2023-04-24 | Stop reason: HOSPADM

## 2023-04-24 RX ORDER — ALBUTEROL SULFATE 0.83 MG/ML
2.5 SOLUTION RESPIRATORY (INHALATION) AS NEEDED
Status: DISCONTINUED | OUTPATIENT
Start: 2023-04-24 | End: 2023-04-24 | Stop reason: HOSPADM

## 2023-04-24 RX ORDER — FLUMAZENIL 0.1 MG/ML
0.2 INJECTION INTRAVENOUS
Status: DISCONTINUED | OUTPATIENT
Start: 2023-04-24 | End: 2023-04-24 | Stop reason: HOSPADM

## 2023-04-24 RX ORDER — PROPOFOL 10 MG/ML
INJECTION, EMULSION INTRAVENOUS
Status: DISCONTINUED | OUTPATIENT
Start: 2023-04-24 | End: 2023-04-24 | Stop reason: HOSPADM

## 2023-04-24 RX ORDER — ONDANSETRON 2 MG/ML
INJECTION INTRAMUSCULAR; INTRAVENOUS AS NEEDED
Status: DISCONTINUED | OUTPATIENT
Start: 2023-04-24 | End: 2023-04-24 | Stop reason: HOSPADM

## 2023-04-24 RX ORDER — NALOXONE HYDROCHLORIDE 0.4 MG/ML
0.04 INJECTION, SOLUTION INTRAMUSCULAR; INTRAVENOUS; SUBCUTANEOUS
Status: DISCONTINUED | OUTPATIENT
Start: 2023-04-24 | End: 2023-04-24 | Stop reason: HOSPADM

## 2023-04-24 RX ADMIN — GLUCAGON 0.5 MG: KIT at 16:24

## 2023-04-24 RX ADMIN — MIDAZOLAM HYDROCHLORIDE 2 MG: 1 INJECTION, SOLUTION INTRAMUSCULAR; INTRAVENOUS at 14:07

## 2023-04-24 RX ADMIN — PROCHLORPERAZINE EDISYLATE 5 MG: 5 INJECTION INTRAMUSCULAR; INTRAVENOUS at 06:01

## 2023-04-24 RX ADMIN — SUCCINYLCHOLINE CHLORIDE 140 MG: 20 INJECTION, SOLUTION INTRAMUSCULAR; INTRAVENOUS at 14:13

## 2023-04-24 RX ADMIN — FAMOTIDINE 20 MG: 10 INJECTION INTRAVENOUS at 14:20

## 2023-04-24 RX ADMIN — ONDANSETRON HYDROCHLORIDE 4 MG: 2 SOLUTION INTRAMUSCULAR; INTRAVENOUS at 15:17

## 2023-04-24 RX ADMIN — INDOCYANINE GREEN AND WATER 7.5 MG: KIT at 13:10

## 2023-04-24 RX ADMIN — KETOROLAC TROMETHAMINE 30 MG: 30 INJECTION, SOLUTION INTRAMUSCULAR; INTRAVENOUS at 16:20

## 2023-04-24 RX ADMIN — Medication 10 ML: at 06:00

## 2023-04-24 RX ADMIN — PROPOFOL 100 MCG/KG/MIN: 10 INJECTION, EMULSION INTRAVENOUS at 14:30

## 2023-04-24 RX ADMIN — FENTANYL CITRATE 50 MCG: 50 INJECTION, SOLUTION INTRAMUSCULAR; INTRAVENOUS at 14:46

## 2023-04-24 RX ADMIN — SODIUM CHLORIDE, POTASSIUM CHLORIDE, SODIUM LACTATE AND CALCIUM CHLORIDE 125 ML/HR: 600; 310; 30; 20 INJECTION, SOLUTION INTRAVENOUS at 04:56

## 2023-04-24 RX ADMIN — ROCURONIUM BROMIDE 30 MG: 10 INJECTION INTRAVENOUS at 14:34

## 2023-04-24 RX ADMIN — SODIUM CHLORIDE, POTASSIUM CHLORIDE, SODIUM LACTATE AND CALCIUM CHLORIDE 125 ML/HR: 600; 310; 30; 20 INJECTION, SOLUTION INTRAVENOUS at 19:02

## 2023-04-24 RX ADMIN — ROCURONIUM BROMIDE 10 MG: 10 INJECTION INTRAVENOUS at 14:13

## 2023-04-24 RX ADMIN — ONDANSETRON 4 MG: 2 INJECTION INTRAMUSCULAR; INTRAVENOUS at 04:56

## 2023-04-24 RX ADMIN — LIDOCAINE HYDROCHLORIDE 100 MG: 20 INJECTION, SOLUTION EPIDURAL; INFILTRATION; INTRACAUDAL; PERINEURAL at 14:13

## 2023-04-24 RX ADMIN — HYDROMORPHONE HYDROCHLORIDE 0.5 MG: 1 INJECTION, SOLUTION INTRAMUSCULAR; INTRAVENOUS; SUBCUTANEOUS at 00:50

## 2023-04-24 RX ADMIN — PROPOFOL 200 MG: 10 INJECTION, EMULSION INTRAVENOUS at 14:13

## 2023-04-24 RX ADMIN — GLYCOPYRROLATE 0.4 MG: 0.2 INJECTION INTRAMUSCULAR; INTRAVENOUS at 15:39

## 2023-04-24 RX ADMIN — FENTANYL CITRATE 100 MCG: 50 INJECTION, SOLUTION INTRAMUSCULAR; INTRAVENOUS at 14:07

## 2023-04-24 RX ADMIN — NEOSTIGMINE METHYLSULFATE 3 MG: 1 INJECTION, SOLUTION INTRAVENOUS at 15:39

## 2023-04-24 RX ADMIN — DEXAMETHASONE SODIUM PHOSPHATE 8 MG: 4 INJECTION, SOLUTION INTRAMUSCULAR; INTRAVENOUS at 14:20

## 2023-04-24 RX ADMIN — Medication 10 ML: at 21:29

## 2023-04-24 RX ADMIN — CEFAZOLIN SODIUM 2 G: 1 POWDER, FOR SOLUTION INTRAMUSCULAR; INTRAVENOUS at 14:22

## 2023-04-24 RX ADMIN — SODIUM CHLORIDE, POTASSIUM CHLORIDE, SODIUM LACTATE AND CALCIUM CHLORIDE 125 ML/HR: 600; 310; 30; 20 INJECTION, SOLUTION INTRAVENOUS at 13:09

## 2023-04-24 NOTE — PROGRESS NOTES
Just received a call from [facility] from preop. Report was given to her. Pt will go to surgery in an hr. Pt will get another CHG bath before going down.

## 2023-04-24 NOTE — ANESTHESIA PREPROCEDURE EVALUATION
Relevant Problems   CARDIOVASCULAR   (+) HTN (hypertension), benign      PERSONAL HX & FAMILY HX OF CANCER   (+) Basal cell carcinoma of nose       Anesthetic History   No history of anesthetic complications            Review of Systems / Medical History  Patient summary reviewed and pertinent labs reviewed    Pulmonary  Within defined limits                 Neuro/Psych   Within defined limits           Cardiovascular    Hypertension: well controlled            Pertinent negatives: No valvular problems/murmurs  Exercise tolerance: >4 METS     GI/Hepatic/Renal  Within defined limits              Endo/Other  Within defined limits           Other Findings   Comments: Cholithiasis           Physical Exam    Airway  Mallampati: II    Neck ROM: normal range of motion        Cardiovascular  Regular rate and rhythm,  S1 and S2 normal,  no murmur, click, rub, or gallop             Dental  No notable dental hx       Pulmonary  Breath sounds clear to auscultation               Abdominal         Other Findings            Anesthetic Plan    ASA: 2  Anesthesia type: general          Induction: Intravenous  Anesthetic plan and risks discussed with: Patient and Spouse

## 2023-04-24 NOTE — BRIEF OP NOTE
Brief Postoperative Note    Patient: Parul Ferraro  YOB: 1980  MRN: [id number]    Date of Procedure: 4/24/2023     Pre-Op Diagnosis: Cholecystitis [K81.9], choledocholithiasis    Post-Op Diagnosis: Same as preoperative diagnosis.       Procedure(s):  CHOLECYSTECTOMY ROBOTIC ASSISTED WITH IC GREEN AND intraoperative cholangiogram, interpretation of cholangiogram images    Surgeon(s):  Irwin Lombard., MD    Surgical Assistant: Surg Asst-1: Cherie Rosenberg    Anesthesia: General     Estimated Blood Loss (mL): Minimal    Complications: None    Specimens: gallbladder    Implants:none    Drains: none    Findings: stone at end of common bile duct, dilated common bile duct    Electronically Signed by Elsy Cotto MD on 4/24/2023 at 3:42 PM

## 2023-04-24 NOTE — PROGRESS NOTES
Shahid Diaz [facility]  [address] [facility], [address] [place] [address]  [phone]      Hospitalist  Progress Note      NAME:         Ant James   :        1980  MRM:        [id number]    Date of service: 2023      Subjective: Patient seen and examined by me. Patient admitted with cholelithiasis with choledocholithiasis. She still has nausea but no vomiting. No fever or chills. Objective:    Vital Signs:    Visit Vitals  /81 (BP 1 Location: Left upper arm, BP Patient Position: Lying)   Pulse 62   Temp 98 °F (36.7 °C)   Resp 14   Ht 5' 7\" (1.702 m)   Wt 90.7 kg (200 lb)   SpO2 93%   Breastfeeding No   BMI 31.32 kg/m²        Intake/Output Summary (Last 24 hours) at 2023 1157  Last data filed at 2023 0543  Gross per 24 hour   Intake 2991.66 ml   Output 1550 ml   Net 1441.66 ml        Current inpatient medications reviewed:  Current Facility-Administered Medications   Medication Dose Route Frequency    prochlorperazine (COMPAZINE) injection 5 mg  5 mg IntraVENous Q6H PRN    sodium chloride (NS) flush 5-40 mL  5-40 mL IntraVENous Q8H    sodium chloride (NS) flush 5-40 mL  5-40 mL IntraVENous PRN    polyethylene glycol (MIRALAX) packet 17 g  17 g Oral DAILY PRN    ondansetron (ZOFRAN ODT) tablet 4 mg  4 mg Oral Q8H PRN    Or    ondansetron (ZOFRAN) injection 4 mg  4 mg IntraVENous Q6H PRN    lactated Ringers infusion  125 mL/hr IntraVENous CONTINUOUS    HYDROmorphone (DILAUDID) syringe 0.5 mg  0.5 mg IntraVENous Q3H PRN       Physical Examination:    General:   Well looking patient in no acute distress  Eyes:  icteric conjunctivae, PERRLA with no discharge.   ENT:   no ottorrhea or rhinorrhea with dry mucous membranes  Neck: no masses, thyroid non-tender and trachea central.  Pulm: clear breath sounds without crackles or wheezes  Card:  no JVD or murmurs, has regular and normal S1, S2 without thrills, bruits or peripheral edema  Abd:  Soft, mild discomfort upper abdomen, non-distended, normoactive bowel sounds   Musc:  No cyanosis, clubbing, atrophy or deformities. Skin:  No rashes, bruising or ulcers. Neuro: Awake and alert. Generally a non focal exam. Follows commands appropriately  Psych:  Has a good insight and is oriented x 3    Diagnostic testing:    Laboratory data reviewed and independently interpreted:    Recent Labs     04/24/23  0538 04/23/23  0548 04/22/23 2113   WBC 8.6 7.4 7.4   HGB 14.3 13.8 13.9   HCT 42.3 41.7 42.6    287 284     Recent Labs     04/24/23  0538 04/23/23  0548 04/22/23 2113    138 141   K 4.2 4.0 4.2    110* 102   CO2 27 25 26   * 109* 104*   BUN 7 6 9   CREA 0.81 0.77 0.88   CA 8.6 8.5 8.9   ALB 3.4* 3.4* 3.8   * 662* 733*     No components found for: GLPOC    Cultures, Imaging studies reviewed and reports noted, available notes from other care providers - all reviewed by me on: 4/24/2023    Assessment and Plan:    Calculus of gallbladder and bile duct with obstruction (4/23/2023) / Choledocholithiasis (4/22/2023) POA: admitted with abdominal pain. CT scan abdomen and pelvis done showed cholelithiasis, with suspected choledocholithiasis. There is intra- and extra-hepatic biliary dilatation. No evidence of cholecystitis. Seen by Gi and general surgery. Likely lap cintia with IOC vs ERCP. Keep NPO. IV fluids. IV Dilaudid PRN    Elevated LFTs (4/23/2023) POA: likely due to the above. Continue to monitor    HTN (hypertension), benign (4/23/2023) POA: BPs stable. Monitor    Care Plan discussed with: Patient, Nursing, CM     Prophylaxis:  SCD's                Anticipated Disposition:  Home w/Family    PCP:      None     I have personally examined and treated the patient at bedside during this period.  To assist coordination of care and communication with nursing and staff, this note may be preliminary early in the day, but finalized by end of the day.         ___________________________________________________    Attending Physician:   Shonna Contreras MD

## 2023-04-24 NOTE — PROGRESS NOTES
4/24/2023  11:10 AM  CM completed assessment w/ pt I person. Charted demographics  verified, pt lives w/ spouse and their 13 and 24 yo children in a private residence. At baseline pt is ambulatory, independent  w/ her ADLs and drives, no fall history  Reason for Admission:  Emergent for Choledocholithiasis  Pt is a 36 yo female who presents to [facility] c/o RUQ abdominal pain for 1 week       Past Medical History:   Diagnosis Date    Family history of skin cancer      Sunburn, blistering                  RUR Score:    N/ A pt is OBS Low Risk of Readmission/Green                 Plan for utilizing home health:     No history of HH or Rehab, pt is independent at baseline for all ADLs.     DME: none  Rx: [employer], pt uses [employer]  and  is usually covered for  her medications  PCP: First and Last name:  None     Name of Practice:    Are you a current patient: Yes/No:    Approximate date of last visit:    Can you participate in a virtual visit with your PCP:                     Current Advanced Directive/Advance Care Plan: Full Code  HCDM/NOK spouse Nini Agrawal 61 60 57    Healthcare Decision Maker:   Click here to complete YupiCall including selection of the Healthcare Decision Maker Relationship (ie \"Primary\")                             Transition of Care Plan:      RUR N/A pt I sOBS Low riisk of Readmission/green  LOS 2 Days  Hospital admission ULISES for medical Management  GI following , plan for ERCP  Surgery following, possible Lap Vianca today         CM to follow through for treatment/response  DC when stable to home   Outpatient follow up surgery  Family will transport at DC  There are no anticipated CM  DC needs           Care Management Interventions  PCP Verified by CM: No  Palliative Care Criteria Met (RRAT>21 & CHF Dx)?: No  Mode of Transport at Discharge: Self (Family)  Physical Therapy Consult: No  Occupational Therapy Consult: No  Speech Therapy Consult: No  Support Systems: Spouse/Significant Other, Child(joanna)  Confirm Follow Up Transport: Family  Discharge Location  Patient Expects to be Discharged to[de-identified] Home with family assistance.   KINDRA Humphrey

## 2023-04-24 NOTE — ANESTHESIA POSTPROCEDURE EVALUATION
Procedure(s):  CHOLECYSTECTOMY ROBOTIC ASSISTED WITH IC GREEN AND IOC  ENDOSCOPIC RETROGRADE CHOLANGIOPANCREATOGRAPHY (ERCP); BALLOON SWEEP; FLUOROSCOPY; SPHINCTEROTOMY. general    Anesthesia Post Evaluation      Multimodal analgesia: multimodal analgesia not used between 6 hours prior to anesthesia start to PACU discharge  Patient location during evaluation: PACU  Patient participation: complete - patient participated  Level of consciousness: awake and awake and alert  Pain score: 2  Airway patency: patent  Anesthetic complications: no  Cardiovascular status: acceptable and stable  Respiratory status: acceptable  Hydration status: acceptable  Post anesthesia nausea and vomiting:  none  Final Post Anesthesia Temperature Assessment:  Normothermia (36.0-37.5 degrees C)      INITIAL Post-op Vital signs:   Vitals Value Taken Time   /82 04/24/23 1739   Temp 36.5 °C (97.7 °F) 04/24/23 1653   Pulse 59 04/24/23 1742   Resp 13 04/24/23 1742   SpO2 94 % 04/24/23 1742   Vitals shown include unvalidated device data.

## 2023-04-24 NOTE — PROCEDURES
Tonia Andrade M.D.  [phone]           2023              ERCP Operative Report    Benedict Thompson  :  1980  [address] [place] [address] Medical Record Number:  [id number]      Procedure Type:   ERCPwith biliary sphincterotomy, biliary stone removal     Indications: jaundice, abnormal CT/MRCP, common bile duct stone, pancreatobiliary pain, abnormal cholangiogram    : Crystal Cantu MD    Referring Provider:    Dr. Marlon Weber    Exam:  Airway: clear, no airway problems anticipated  Heart: RRR, without gallops or rubs  Lungs: clear bilaterally without wheezes, crackles, or rhonchi  Abdomen: soft, nontender, nondistended, bowel sounds present  Mental Status: awake, alert and oriented to person, place and time    Sedation:  General anesthesia    Procedure Details:  After discussing in detail the procedure with all its risks and potential complications including but not limited to bleeding, perforation, increased risk of pancreatitis with potential lethal complications related to the pancreatitis, sepsis and multiorgan failure and complications related to sedation, informed consent was obtained. Ample amount of time was allowed for questions and alternatives to this procedure were provided. The patient was taken to the fluoroscopy suite and placed in the prone position. Upon sequential sedation as per above, the Olympus duodenoscope [id number]   was inserted via the mouthpiece and carefully advanced to the stomach where all fluid secretions were suctioned and then the scope advanced to the second portion of the duodenum. The quality of visualization was excellent. The duodenoscope was withdrawn into a short position. Findings:   Esophagus:normal  Stomach: normal   Duodenum/jejunum: normal    Ampulla:-normal   Cholangiogram: Dilated CBD with a filling defect seen in the distal CBD. Clips seen at the cystic duct stump. Intrahepatic ducts not filled with contrast dye.   Pancreatogram: Not performed    Specimen Removed:  None    Complications: None. EBL:  None. Interventions:      Pancreatic: none  Biliary: After selective cannulation of the common bile duct using the Jagtome sphincterotome, the 0.025 fr guidewire was advanced into the intrahepatic ducts. Contrast dye injected and cholagiogram obtained as reported above. An adequate 10 mm sphincterotomy was then performed using ERBE cautery. No bleeding occurred. A small stone and some sludge could be seen immediately coming out. The sphincterotome was then exchanged over the guidewire with a 9 to 12 mm biliary balloon. Three balloon sweeps were performed starting from the bifurcation down. Another pigmented yellow stone came out upon initial sweep. Then two clear sweeps followed. Occlusion cholangiogram was obtained, no further filling defects seen. Excellent bile flow could be seen following retrieval of the balloon catheter. Impression:      Choledocholithiasis, removed after adequate endoscopic sphincterotomy    Recommendations:     - Clear liquid diet if ok with surgery and advance in am as tolerated. - Repeat labs in am.  - Ok to discharge home tomorrow from GI standpoint if doing well.         Anneliese Funes MD  4/24/2023  5:50 PM

## 2023-04-24 NOTE — PROGRESS NOTES
Bedside shift change report given to ANA Jarrett(oncoming nurse) by Yee Thomas (offgoing nurse). Report included the following information SBAR, Kardex, ED Summary, OR Summary, Procedure Summary, Intake/Output, MAR, Accordion, Recent Results, and Med Rec Status.

## 2023-04-24 NOTE — PROGRESS NOTES
Problem: General Medical Care Plan  Goal: *Vital signs within specified parameters  Outcome: Progressing Towards Goal  Goal: *Labs within defined limits  Outcome: Progressing Towards Goal  Goal: *Absence of infection signs and symptoms  Outcome: Progressing Towards Goal  Goal: *Optimal pain control at patient's stated goal  Outcome: Progressing Towards Goal  Goal: *Skin integrity maintained  Outcome: Progressing Towards Goal  Goal: *Fluid volume balance  Outcome: Progressing Towards Goal  Goal: *Optimize nutritional status  Outcome: Progressing Towards Goal  Goal: *Anxiety reduced or absent  Outcome: Progressing Towards Goal  Goal: *Progressive mobility and function (eg: ADL's)  Outcome: Progressing Towards Goal     Problem: Patient Education: Go to Patient Education Activity  Goal: Patient/Family Education  Outcome: Progressing Towards Goal     Problem: Aspiration - Risk of  Goal: *Absence of aspiration  Outcome: Progressing Towards Goal     Problem: Patient Education: Go to Patient Education Activity  Goal: Patient/Family Education  Outcome: Progressing Towards Goal

## 2023-04-24 NOTE — PERIOP NOTES
TRANSFER - OUT REPORT:    [id number]  Verbal report given to Usha (name) on Jayden Palafox  being transferred to 5th floor (unit) for routine post - op       Report consisted of patients Situation, Background, Assessment and   Recommendations(SBAR). Information from the following report(s) SBAR was reviewed with the receiving nurse. Lines:   Peripheral IV 04/22/23 Right Antecubital (Active)   Site Assessment Clean, dry, & intact 04/24/23 1700   Phlebitis Assessment 0 04/24/23 1700   Infiltration Assessment 0 04/24/23 1700   Dressing Status Clean, dry, & intact 04/24/23 1700   Dressing Type Tape;Transparent 04/24/23 1700   Hub Color/Line Status Pink; Infusing;Patent 04/24/23 1700   Action Taken Open ports on tubing capped 04/24/23 1700   Alcohol Cap Used Yes 04/24/23 1700        Opportunity for questions and clarification was provided. Patient transported with:   Registered Nurse      [phone] Pt arrived to room 511 at [phone]. Waited for bedside RN for 30 minutes. Unable to do a bedside handoff since the primary RN is not available. Had another floor RN checked on the patient before leaving the patient. Pt stable at this time. End of periop care.

## 2023-04-24 NOTE — PROGRESS NOTES
General Surgery Daily Progress Note    Patient: William Abdalla MRN: [id number]  SSN: [id number]    YOB: 1980  Age: 37 y.o. Sex: female      Admit Date: 4/22/2023    POD Day of Surgery    Procedure: Procedure(s):  CHOLECYSTECTOMY ROBOTIC ASSISTED WITH IC GREEN AND IOC    Subjective:   Reports doing well but had some pain and nausea last night  TBil elevated today to 4.8    Current Facility-Administered Medications   Medication Dose Route Frequency    prochlorperazine (COMPAZINE) injection 5 mg  5 mg IntraVENous Q6H PRN    lidocaine (PF) (XYLOCAINE) 10 mg/mL (1 %) injection 0.1 mL  0.1 mL SubCUTAneous PRN    lactated Ringers infusion  125 mL/hr IntraVENous CONTINUOUS    sodium chloride (NS) flush 5-40 mL  5-40 mL IntraVENous Q8H    sodium chloride (NS) flush 5-40 mL  5-40 mL IntraVENous PRN    naloxone (NARCAN) injection 0.04 mg  0.04 mg IntraVENous Multiple    flumazeniL (ROMAZICON) 0.1 mg/mL injection 0.2 mg  0.2 mg IntraVENous Multiple    indocyanine green (IC GREEN) 7.5 mg in sterile water (preservative free) 3 mL injection  7.5 mg IntraVENous NOW    ceFAZolin (ANCEF) 2 g in sterile water (preservative free) 20 mL IV syringe  2 g IntraVENous ONCE    sodium chloride (NS) flush 5-40 mL  5-40 mL IntraVENous Q8H    sodium chloride (NS) flush 5-40 mL  5-40 mL IntraVENous PRN    polyethylene glycol (MIRALAX) packet 17 g  17 g Oral DAILY PRN    ondansetron (ZOFRAN ODT) tablet 4 mg  4 mg Oral Q8H PRN    Or    ondansetron (ZOFRAN) injection 4 mg  4 mg IntraVENous Q6H PRN    lactated Ringers infusion  125 mL/hr IntraVENous CONTINUOUS    HYDROmorphone (DILAUDID) syringe 0.5 mg  0.5 mg IntraVENous Q3H PRN        Objective:   No intake/output data recorded.   04/22 1901 - 04/24 0700  In: 3783.3 [I.V.:3783.3]  Out: 0368 [[facility]:7894]  Patient Vitals for the past 8 hrs:   BP Temp Pulse Resp SpO2   04/24/23 1201 137/76 98.4 °F (36.9 °C) 67 14 94 %   04/24/23 0820 134/81 98 °F (36.7 °C) 62 14 93 %   04/24/23 0507 129/72 98.1 °F (36.7 °C) 79 18 96 %       Physical Exam:  General: Alert, cooperative, NAD  Lungs: Unlabored  Abdomen: Soft, RUQ and epigastric tenderness, non-distended.   Extremities: Warm, moves all, no edema  Skin:  Warm and dry, no rash    Labs:   Recent Labs     04/24/23  0538   WBC 8.6   HGB 14.3   HCT 42.3        Recent Labs     04/24/23  0538      K 4.2      CO2 27   *   BUN 7   CREA 0.81   CA 8.6   ALB 3.4*   TBILI 4.8*   *       Assessment / Plan:     POD Day of Surgery    Procedure: Procedure(s):  CHOLECYSTECTOMY ROBOTIC ASSISTED WITH IC GREEN AND IOC  For cholecystectomy with cholangiogram today and ERCP after with Dr. Radha Gaona  All questions answered        Claudette Deleon MD

## 2023-04-24 NOTE — OP NOTES
Patient: Katherin Choi MRN: [id number]  SSN: [id number]    YOB: 1980  Age: 37 y.o. Sex: female        OPERATIVE REPORT - LAPAROSCOPIC ASSISTED ROBOTIC                                                                  MULTIPORT CHOLECYSTECTOMY    PREOPERATIVE DIAGNOSIS: cholecystitis, choledocholithiasis    POSTOPERATIVE DIAGNOSIS: same  OPERATIVE PROCEDURE:  Robotic assisted laparoscopic multiport cholecystectomy with indocyanine green, intraoperative cholangiogram, interpretation of cholangiogram images      SURGEON: Daly Funes MD    ANESTHESIA: General.    ANESTHESIOLOGIST: General    COMPLICATIONS: None    ASSISTANT: None    ESTIMATED BLOOD LOSS: Minimal.    INDICATION: Documented in the history and physical.    FINDINGS: stone at end of common bile duct, dilated common bile duct    PROCEDURE: Patient was brought in the room and placed supine on the operating table. After general anesthesia induction and placement of endotracheal tube, patient's  abdomen was prepped and draped in standard surgical fashion. The  laparoscopic camera and CO2 insufflation apparatus were affixed to the  drapes in the usual fashion. Through a supraumbilical incision, a Veress needle was introduced and its  intraperitoneal position was confirmed with low intra-abdominal initial pressures. CO2 insufflation was connected and pneumoperitoneum was created and maintained at 15 mmHg. An 8-mm robotic trocar was introduced and 8mm robotic camera  Was passed through and immediate area was inspected and it was confirmed that there was no  intraabdominal injury during introduction of pneumoperitoneum and the  trocar. Under direct vision, 2 8-mm robotic ports were positioned, one to the  Left upper quadrant, one at the right upper quadrant, and another 5mm port between the umbilical and right upper quadrant port. Patient was positioned in reverse Trendelenburg with a tilt to the  left.     The fundus was grasped and lifted up towards the diaphragm. The  infundibulum was retracted laterally and inferiorly after releasing the  adhesions. The junction of the cystic duct and gallbladder was clearly identified, and  an adequate length of the cystic duct was dissected out. Similarly, the cystic  artery was also dissected out and an adequate length was displayed. Critical view of Calot's triangle was obtained and the structures were clearly identified. After this was satisfactorily done, The cystic artery was  singly clipped on both sides and divided. The cystic duct was clipped distally. A Ductotomy was performed with scissors. Catheter was inserted in cystic duct and cholangiogram was done. This showed stone at end of common bile duct, even though some contrast made it into the small bowel. I interpreted the cholangiogram images  Catheter was then removed and 2 clips placed on the cystic duct proximally. Cystic duct was transected  The gallbladder was then gently dissected off from the liver bed  using electrocautery and achieving hemostasis. as the dissection proceeded  upwards towards the fundus. The gallbladder was thus removed from its bed. Using a 8mm Endobag through the left upper quadrant port, the gallbladder  was removed intact. Liver bed was inspected. No bleeding or bile leak was noted. The left upper quadrant port fascia was closed with 0 Vicryl with a figure of 8 stitch using a stortz device. All incisions had skin approximated with subcuticular 4-0 Vicryl   with Dermabond to the skin. Marcaine  was infiltrated into each port site. SPECIMEN: Gallbladder. COUNTS: Correct at the completion of surgery.     Carly Hubbard MD

## 2023-04-25 VITALS
SYSTOLIC BLOOD PRESSURE: 150 MMHG | BODY MASS INDEX: 31.39 KG/M2 | HEART RATE: 76 BPM | RESPIRATION RATE: 17 BRPM | OXYGEN SATURATION: 98 % | DIASTOLIC BLOOD PRESSURE: 84 MMHG | HEIGHT: 67 IN | WEIGHT: 200 LBS | TEMPERATURE: 98.3 F

## 2023-04-25 LAB
ALBUMIN SERPL-MCNC: 2.9 G/DL (ref 3.5–5)
ALBUMIN/GLOB SERPL: 1 (ref 1.1–2.2)
ALP SERPL-CCNC: 305 U/L (ref 45–117)
ALT SERPL-CCNC: 348 U/L (ref 12–78)
ANION GAP SERPL CALC-SCNC: 5 MMOL/L (ref 5–15)
AST SERPL-CCNC: 52 U/L (ref 15–37)
BASOPHILS # BLD: 0 K/UL (ref 0–0.1)
BASOPHILS NFR BLD: 0 % (ref 0–1)
BILIRUB SERPL-MCNC: 1.6 MG/DL (ref 0.2–1)
BUN SERPL-MCNC: 8 MG/DL (ref 6–20)
BUN/CREAT SERPL: 12 (ref 12–20)
CALCIUM SERPL-MCNC: 8.4 MG/DL (ref 8.5–10.1)
CHLORIDE SERPL-SCNC: 108 MMOL/L (ref 97–108)
CO2 SERPL-SCNC: 26 MMOL/L (ref 21–32)
CREAT SERPL-MCNC: 0.69 MG/DL (ref 0.55–1.02)
DIFFERENTIAL METHOD BLD: ABNORMAL
EOSINOPHIL # BLD: 0 K/UL (ref 0–0.4)
EOSINOPHIL NFR BLD: 0 % (ref 0–7)
ERYTHROCYTE [DISTWIDTH] IN BLOOD BY AUTOMATED COUNT: 13.7 % (ref 11.5–14.5)
GLOBULIN SER CALC-MCNC: 3 G/DL (ref 2–4)
GLUCOSE SERPL-MCNC: 123 MG/DL (ref 65–100)
HCT VFR BLD AUTO: 38 % (ref 35–47)
HGB BLD-MCNC: 12.9 G/DL (ref 11.5–16)
IMM GRANULOCYTES # BLD AUTO: 0 K/UL (ref 0–0.04)
IMM GRANULOCYTES NFR BLD AUTO: 0 % (ref 0–0.5)
LYMPHOCYTES # BLD: 1.6 K/UL (ref 0.8–3.5)
LYMPHOCYTES NFR BLD: 14 % (ref 12–49)
MCH RBC QN AUTO: 30.6 PG (ref 26–34)
MCHC RBC AUTO-ENTMCNC: 33.9 G/DL (ref 30–36.5)
MCV RBC AUTO: 90 FL (ref 80–99)
MONOCYTES # BLD: 0.6 K/UL (ref 0–1)
MONOCYTES NFR BLD: 5 % (ref 5–13)
NEUTS SEG # BLD: 9.4 K/UL (ref 1.8–8)
NEUTS SEG NFR BLD: 81 % (ref 32–75)
NRBC # BLD: 0 K/UL (ref 0–0.01)
NRBC BLD-RTO: 0 PER 100 WBC
PLATELET # BLD AUTO: 285 K/UL (ref 150–400)
PMV BLD AUTO: 9.8 FL (ref 8.9–12.9)
POTASSIUM SERPL-SCNC: 3.8 MMOL/L (ref 3.5–5.1)
PROT SERPL-MCNC: 5.9 G/DL (ref 6.4–8.2)
RBC # BLD AUTO: 4.22 M/UL (ref 3.8–5.2)
SODIUM SERPL-SCNC: 139 MMOL/L (ref 136–145)
WBC # BLD AUTO: 11.6 K/UL (ref 3.6–11)

## 2023-04-25 PROCEDURE — G0378 HOSPITAL OBSERVATION PER HR: HCPCS

## 2023-04-25 PROCEDURE — 74011000250 HC RX REV CODE- 250: Performed by: INTERNAL MEDICINE

## 2023-04-25 PROCEDURE — 36415 COLL VENOUS BLD VENIPUNCTURE: CPT

## 2023-04-25 PROCEDURE — 74011250636 HC RX REV CODE- 250/636: Performed by: INTERNAL MEDICINE

## 2023-04-25 PROCEDURE — 80053 COMPREHEN METABOLIC PANEL: CPT

## 2023-04-25 PROCEDURE — 85025 COMPLETE CBC W/AUTO DIFF WBC: CPT

## 2023-04-25 RX ADMIN — Medication 10 ML: at 05:29

## 2023-04-25 RX ADMIN — SODIUM CHLORIDE, POTASSIUM CHLORIDE, SODIUM LACTATE AND CALCIUM CHLORIDE 125 ML/HR: 600; 310; 30; 20 INJECTION, SOLUTION INTRAVENOUS at 02:20

## 2023-04-25 NOTE — PROGRESS NOTES
General Surgery Daily Progress Note    Patient: Montrell Coughlin MRN: [id number]  SSN: [id number]    YOB: 1980  Age: 37 y.o. Sex: female      Admit Date: 4/22/2023    POD 1 Day Post-Op    Procedure: Procedure(s) with comments:  CHOLECYSTECTOMY ROBOTIC ASSISTED WITH IC GREEN AND IOC  ENDOSCOPIC RETROGRADE CHOLANGIOPANCREATOGRAPHY (ERCP); BALLOON SWEEP; FLUOROSCOPY; SPHINCTEROTOMY - Button lot No [id number]  ERCP Scope No [id number]    Subjective:   Doing well  Monse diet  Denies N/V  Pain controlled    Current Facility-Administered Medications   Medication Dose Route Frequency    prochlorperazine (COMPAZINE) injection 5 mg  5 mg IntraVENous Q6H PRN    sodium chloride (NS) flush 5-40 mL  5-40 mL IntraVENous Q8H    sodium chloride (NS) flush 5-40 mL  5-40 mL IntraVENous PRN    polyethylene glycol (MIRALAX) packet 17 g  17 g Oral DAILY PRN    ondansetron (ZOFRAN ODT) tablet 4 mg  4 mg Oral Q8H PRN    Or    ondansetron (ZOFRAN) injection 4 mg  4 mg IntraVENous Q6H PRN    lactated Ringers infusion  125 mL/hr IntraVENous CONTINUOUS    HYDROmorphone (DILAUDID) syringe 0.5 mg  0.5 mg IntraVENous Q3H PRN        Objective:   04/25 0701 - 04/25 1900  In: 120 [P.O.:120]  Out: 0   04/23 1901 - 04/25 0700  In: 4737.1 [P.O.:400; I.V.:4337.1]  Out: 800 [Urine:800]  Patient Vitals for the past 8 hrs:   BP Temp Pulse Resp SpO2   04/25/23 1054 (!) 150/84 98.3 °F (36.8 °C) 76 17 98 %   04/25/23 0746 (!) 142/86 98.7 °F (37.1 °C) 63 14 97 %   04/25/23 0509 (!) 145/80 97.9 °F (36.6 °C) (!) 55 13 97 %       Physical Exam:  General: Alert, cooperative, NAD  Lungs: Unlabored  Abdomen: Soft, appropriately tender, non-distended. Incisions C/D/I. Gant Lie    Extremities: Warm, moves all, no edema  Skin:  Warm and dry, no rash    Labs:   Recent Labs     04/25/23 0528   WBC 11.6*   HGB 12.9   HCT 38.0        Recent Labs     04/25/23 0528      K 3.8      CO2 26   *   BUN 8   CREA 0.69   CA 8.4*   ALB 2.9*   TBILI 1.6*   ALT 348*       Assessment / Plan:     POD 1 Day Post-Op    Procedure: Procedure(s) with comments:  CHOLECYSTECTOMY ROBOTIC ASSISTED WITH IC GREEN AND IOC  ENDOSCOPIC RETROGRADE CHOLANGIOPANCREATOGRAPHY (ERCP); BALLOON SWEEP; FLUOROSCOPY; SPHINCTEROTOMY - Button lot No [id number]  ERCP Scope No [id number]    Doing well post op and post ERCP  Ok to go home  Follow up in [facility] in 2 weeks      Rachel Ziegler MD

## 2023-04-25 NOTE — PROGRESS NOTES
4/25/2023  8:50 AM  Pt emergently admitted 4/22/23  for Choledocholithiasis  POD #1 cholecystectomy. Medically stable for DC to day to home today pending surgery clearance. There are no CM DC needs noted.   Pt's spouse will transport home   Care Management Interventions  PCP Verified by CM: No  Palliative Care Criteria Met (RRAT>21 & CHF Dx)?: No  Mode of Transport at Discharge: Self (Family)  Physical Therapy Consult: No  Occupational Therapy Consult: No  Speech Therapy Consult: No  Support Systems: Spouse/Significant Other  Confirm Follow Up Transport: Family  Discharge Location  Patient Expects to be Discharged to[de-identified] Home  KINDRA Salazar

## 2023-04-25 NOTE — PROGRESS NOTES
Medicare Outpatient Observation Notice (MOON)/ [place] Outpatient Observation Notice (Marbella Dunbar) provided to patient/representative with verbal explanation of the notice. Time allotted for questions regarding the notice. Patient /representative provided a completed copy of the MOON/VOON notice. Copy placed on  chart.   Garth Pastrana CMS

## 2023-04-25 NOTE — DISCHARGE SUMMARY
Shahid Crenshaw [place] [address]  [address] [facility], [address] [place] [address]  Tel: [phone]    Hospital Medicine Discharge Summary    Patient ID:    Letty Marie  Age:              37 y.o.    : 1980  MRN:             [id number]     PCP: None     Date of Admission: 2023    Date of Discharge:  2023    Discharge Diagnoses:  Principal Problem:    Calculus of gallbladder and bile duct with obstruction (2023)    Choledocholithiasis (2023)    Elevated LFTs (2023)    HTN (hypertension), benign (2023)    Reason for admission:    Choledocholithiasis [K80.50]    Hospital Course:     Ms. Jeanna Castillo is a 37 y.o. admitted to [facility] and treated for the following:    Calculus of gallbladder and bile duct with obstruction (2023) / Choledocholithiasis (2023) POA: admitted with abdominal pain. CT scan abdomen and pelvis done showed cholelithiasis, with suspected choledocholithiasis. There is intra- and extra-hepatic biliary dilatation. No evidence of cholecystitis. Seen by Gi and general surgery and she had a lap cholecystectomy with IOC as well as an ERCP that showed choledocholithiasis, removed after adequate endoscopic sphincterotomy. She has tolerated her diet well. She denies any pain. Discharged home in stable condition to follow up with general surgery. Elevated LFTs (2023) POA: due to the above and now trending down. Outpatient follow up. HTN (hypertension), benign (2023) POA: BPs stable.      Most recent labs:  Recent Labs     23  0528 23  0538 23  0548   WBC 11.6* 8.6 7.4   HGB 12.9 14.3 13.8   HCT 38.0 42.3 41.7    295 287     Recent Labs     23  0528 23  0538 23  0548    137 138   K 3.8 4.2 4.0    108 110*   CO2 26 27 25   * 125* 109*   BUN 8 7 6   CREA 0.69 0.81 0.77   CA 8.4* 8.6 8.5   ALB 2.9* 3.4* 3.4*   * 551* 662*     No components found for: [place]    Discharge Exam:  Visit Vitals  BP (!) 142/86   Pulse 63   Temp 98.7 °F (37.1 °C)   Resp 14   Ht 5' 7\" (1.702 m)   Wt 90.7 kg (200 lb)   LMP 04/10/2023 (Approximate)   SpO2 97%   Breastfeeding No   BMI 31.32 kg/m²        Patient has been seen and examined. General: well looking and in no acute distress  Pulm: clear breath sounds without wheezes  Card: no murmurs, normal S1, S2 without thrills, bruits   Abd:    soft, non-tender, normoactive bowel sounds  Skin: no rashes and skin turgor is good  Neuro: awake, alert and has a non focal     Activity: Activity as tolerated    Diet: Regular Diet    Current Discharge Medication List        STOP taking these medications       bupivacaine-EPINEPHrine (MARCAINE-EPINEPHRINE) 0.25 %-1:200,000 Soln Comments:   Reason for Stopping:               Tito Montiel MD  [address] [address]  [phone]    Call  to schedule follow up with general surgery    None  None (395) Patient stated that they have no PCP            Follow-up tests or labs: None    Discharge Condition: Stable    Disposition: home    Time taken to co-ordinate and arrange discharge:  35 minutes.     Signed:  Alysha Whitehead MD       4/25/2023   7:55 AM

## 2023-04-25 NOTE — PROGRESS NOTES
Bedside and Verbal shift change report given to ANA Justice (oncoming nurse) by Edilia BRITO RN (offgoing nurse). Report included the following information SBAR, Kardex, Intake/Output, MAR, and Recent Results.

## 2023-04-25 NOTE — DISCHARGE INSTRUCTIONS
Hospital Medicine DISCHARGE INSTRUCTIONS    NAME: Paolo Tellez   :  1980   MRN:  [id number]     Date:     2023    ADMIT DATE: 2023     DISCHARGE DATE: 2023     PRINCIPAL ADMITTING DIAGNOSIS:  Choledocholithiasis [K80.50]    DISCHARGE DIAGNOSES:  Principal Problem:    Calculus of gallbladder and bile duct with obstruction (2023)    Choledocholithiasis (2023)    Elevated LFTs (2023)    HTN (hypertension), benign (2023)    MEDICATIONS:    It is important that medications are taken exactly as they are prescribed on the discharge medication instructions and keep them your  in the bottles provided by the pharmacist.   Keep a list of the medication names, dosages, and times to be taken at all times. Do not take other medications without consulting your doctor. Recommended diet:  Regular Diet    Recommended activity: Activity as tolerated    Post discharge care:    Notify follow up health care provider or return to the emergency department if you cannot get hold of your doctor if you feel worse or experience symptoms similar to those that brought you to hospital    Paige Mai MD  [address] [address]  [phone]    Call  to schedule follow up with general surgery       Information obtained by :  I understand that if any problems occur once I am at home I am to contact my physician and I understand and acknowledge receipt of the instructions indicated above.                                                                                                                                            Physician's or R.N.'s Signature                                                                  Date/Time                                                                                                                                              Patient or Representative Signature Date/Time     POST-OPERATIVE INSTRUCTIONS  OUTPATIENT SURGERY Robotic CHOLECYSTECTOMY    Today you underwent a robotic cholecystectomy which is usually well tolerated. However, below is a list of instructions which are important for you to follow. If you have any questions, please call your surgeons office. 1 EATING: Please eat lightly when you go home today for the first 24 hours. You may resume your regular diet after that. 2. EXERCISE: Limit your exercise for the first week, although stairs or other walking is fine. No strenuous activity (No lifting >10-15lbs) for one month. 3. DRESSING: You may shower in 1 day, unless otherwise instructed by your surgeon. No pools, baths, or hot tubs for 2 weeks. 4. NO LIFTING: No lifting >10lbs for 4 weeks from day of surgery    5. DRIVING: No driving while taking prescription pain medicine, and until post-operative discomfort resolves. Usually you may begin driving within 1-2 weeks. 6. PAIN: A prescription for pain has been given to you or your family. Please use it as prescribed and if this is inadequate, please call your surgeons office. In some cases, Tylenol or Advil may be adequate; and in that case, you will not need to fill the prescription. Please call for any refills during office hours. Pain medication may cause constipation - Colace or Milk of Magnesia may be used as needed. May take Tylenol 1000mg every 6 hours as needed for pain  May take Ibuprofen 600-800mg every 6 hours as needed for pain      7. WOUND: If you notice any increased redness, swelling, pain or fever, please call the office. If this is noticed at a time after normal office hours, please call our answering service. 8. APPOINTMENT: You may already have a follow up appointment.           If not your surgeon would like to see you in his/her office in 14 days, call to make your follow up    If this appointment has not been made for you prior to your departure from Outpatient Surgery, please call your surgeons office to schedule your appointment. If you have any questions or concerns, please do not hesitate to call your surgeon or any other staff member who will be able to assist you.       [address] [facility]  [address]   [address]  [place], [address] [place] [address] [address]  [phone]

## 2023-04-25 NOTE — PROGRESS NOTES
Discharge instructions/AVS discussed in detail with pt. Pt verbalizes understanding of all instructions, including where to get new medications. Pt denies any questions about instructions and has signed paper copy AVS. Pt discharged per MD order, being driven home by family. Pt in no apparent distress at time of discharge with no complaints. IV removed.

## 2023-04-25 NOTE — PROGRESS NOTES
Bedside and Verbal shift change report given to Stephen Alatorre RN (oncoming nurse) by Nickolas Clancy RN (offgoing nurse). Report included the following information SBAR, Kardex, Intake/Output, MAR, and Accordion.

## 2023-05-26 ENCOUNTER — HOSPITAL ENCOUNTER (OUTPATIENT)
Facility: HOSPITAL | Age: 43
Discharge: HOME | End: 2023-05-26
Payer: COMMERCIAL

## 2023-05-26 DIAGNOSIS — Z12.31 VISIT FOR SCREENING MAMMOGRAM: ICD-10-CM

## 2023-05-26 PROCEDURE — 77067 SCR MAMMO BI INCL CAD: CPT

## (undated) DEVICE — ANCHOR TISSUE RETRIEVAL SYSTEM, BAG SIZE 125 ML, PORT SIZE 8 MM: Brand: ANCHOR TISSUE RETRIEVAL SYSTEM

## (undated) DEVICE — SYR 50ML SLIP TIP NSAF LF STRL --

## (undated) DEVICE — FORCEPS ENDOSCP L230CM WRK CHN 2.8CM SHTH 2.4MM JAW OPN

## (undated) DEVICE — Device: Brand: ENDO SMARTCAP

## (undated) DEVICE — PMI DISPOSABLE PUNCTURE CLOSURE DEVICE / SUTURE GRASPER: Brand: PMI

## (undated) DEVICE — RETRIEVAL BALLOON CATHETER: Brand: EXTRACTOR™ PRO RX

## (undated) DEVICE — KIT,1200CC CANISTER,3/16"X6' TUBING: Brand: MEDLINE INDUSTRIES, INC.

## (undated) DEVICE — BITEBLOCK 54FR W/ DENT RIM BLOX

## (undated) DEVICE — ELECTRO LUBE IS A SINGLE PATIENT USE DEVICE THAT IS INTENDED TO BE USED ON ELECTROSURGICAL ELECTRODES TO REDUCE STICKING.: Brand: KEY SURGICAL ELECTRO LUBE

## (undated) DEVICE — OBTRTR BLDELSS OPT 8MM DISP -- DA VINICI XI - SNGL USE

## (undated) DEVICE — INFLATION DEVICE: Brand: ENCORE 26

## (undated) DEVICE — PMI OPERATIVE CHOLANGIOGRAM CATHETER; TUBING IS 76CM IN LENGTH, 16GA WITH A: Brand: PMI

## (undated) DEVICE — INTEGRA® JARIT® KNIGHT NASAL SCISSORS, 6-1/4", 33MM BLADE LENGTH: Brand: INTEGRA® JARIT®

## (undated) DEVICE — LAPAROSCOPIC TROCAR SLEEVE/SINGLE USE: Brand: KII® OPTICAL ACCESS SYSTEM

## (undated) DEVICE — COVER LT HNDL PLAS RIG 1 PER PK

## (undated) DEVICE — BALLOON DILATATION CATHETER: Brand: HURRICANE™ RX

## (undated) DEVICE — SUTURE VCRL SZ 4-0 L27IN ABSRB UD L19MM PS-2 3/8 CIR PRIM J426H

## (undated) DEVICE — INSUFFLATION NEEDLE: Brand: SURGINEEDLE

## (undated) DEVICE — SEAL UNIV 5-8MM DISP BX/10 -- DA VINCI XI - SNGL USE

## (undated) DEVICE — CANNULATING SPHINCTEROTOME: Brand: JAGTOME™ REVOLUTION RX

## (undated) DEVICE — TISSUE RETRIEVAL SYSTEM: Brand: INZII RETRIEVAL SYSTEM

## (undated) DEVICE — FCPS RAD JAW 4LC 240CM W/NDL -- BX/40

## (undated) DEVICE — ROBOTIC GENERAL-SFMC: Brand: MEDLINE INDUSTRIES, INC.

## (undated) DEVICE — DEVICE LCK BILI RAP EXCHG OLPS --

## (undated) DEVICE — POSITIONER HD REST SUPINE LAT

## (undated) DEVICE — SOLUTION IRRIG 1000ML STRL H2O USP PLAS POUR BTL

## (undated) DEVICE — ARM DRAPE

## (undated) DEVICE — TUBING INSUF 0.3UM FLTR W/ LUERLOCK CONN

## (undated) DEVICE — TRIPLE LUMEN NEEDLE KNIFE: Brand: RX NEEDLE KNIFE XL

## (undated) DEVICE — SNARE ENDOSCP M L240CM W27MM SHTH DIA2.4MM CHN 2.8MM OVL

## (undated) DEVICE — SUTURE SZ 0 27IN 5/8 CIR UR-6  TAPER PT VIOLET ABSRB VICRYL J603H

## (undated) DEVICE — ENDO CARRY-ON PROCEDURE KIT INCLUDES ENZYMATIC SPONGE, GAUZE, BIOHAZARD LABEL, TRAY, LUBRICANT, DIRTY SCOPE LABEL, WATER LABEL, TRAY, DRAWSTRING PAD, AND DEFENDO 4-PIECE KIT.: Brand: ENDO CARRY-ON PROCEDURE KIT

## (undated) DEVICE — DERMABOND SKIN ADH 0.7ML -- DERMABOND ADVANCED 12/BX

## (undated) DEVICE — TUBING, SUCTION, 1/4" X 10', STRAIGHT: Brand: MEDLINE

## (undated) DEVICE — GUIDEWIRE ENDOSCP WRK L450CM DIA0.035IN STD SHFT STR RND

## (undated) DEVICE — GLOVE SURG SZ 6 THK91MIL LTX FREE SYN POLYISOPRENE ANTI

## (undated) DEVICE — REM POLYHESIVE ADULT PATIENT RETURN ELECTRODE: Brand: VALLEYLAB

## (undated) DEVICE — BW-412T DISP COMBO CLEANING BRUSH: Brand: SINGLE USE COMBINATION CLEANING BRUSH